# Patient Record
Sex: MALE | Race: WHITE
[De-identification: names, ages, dates, MRNs, and addresses within clinical notes are randomized per-mention and may not be internally consistent; named-entity substitution may affect disease eponyms.]

---

## 2017-02-06 ENCOUNTER — HOSPITAL ENCOUNTER (EMERGENCY)
Dept: HOSPITAL 25 - UCCORT | Age: 67
Discharge: HOME | End: 2017-02-06
Payer: COMMERCIAL

## 2017-02-06 VITALS — SYSTOLIC BLOOD PRESSURE: 141 MMHG | DIASTOLIC BLOOD PRESSURE: 80 MMHG

## 2017-02-06 VITALS — DIASTOLIC BLOOD PRESSURE: 94 MMHG | SYSTOLIC BLOOD PRESSURE: 146 MMHG

## 2017-02-06 DIAGNOSIS — L02.11: Primary | ICD-10-CM

## 2017-02-06 DIAGNOSIS — Z48.01: Primary | ICD-10-CM

## 2017-02-06 PROCEDURE — 99212 OFFICE O/P EST SF 10 MIN: CPT

## 2017-02-06 PROCEDURE — G0463 HOSPITAL OUTPT CLINIC VISIT: HCPCS

## 2017-02-06 PROCEDURE — 99211 OFF/OP EST MAY X REQ PHY/QHP: CPT

## 2017-02-06 NOTE — UC
Skin Complaint HPI





- HPI Summary


HPI Summary: 





large lump on back of neck for a decade. Now irritated, red, painful. No 

drainage. MIld malaise and low-grade fever today





- History of Current Complaint


Chief Complaint: UCSkin


Time Seen by Provider: 02/06/17 07:21


Stated Complaint: LUMP BACK OF NECK


Hx Obtained From: Patient


Onset/Duration: Gradual Onset, Lasting Days - 3


Timing: Constant


Onset Severity: Mild


Current Severity: Moderate


Location: Other - back of neck


Character: Swelling, Redness, Painful


Aggravating: Touch


Alleviating: Nothing


Associated Signs & Symptoms: Positive: Tenderness





- Allergy/Home Medications


Allergies/Adverse Reactions: 


 Allergies











Allergy/AdvReac Type Severity Reaction Status Date / Time


 


No Known Allergies Allergy   Verified 02/06/17 07:38











Home Medications: 


 Home Medications





Aspirin [Fang Aspirin] 650 mg PO ONCE PRN 02/06/17 [History Confirmed 02/06/17]











Review of Systems


Constitutional: Fever


Skin: Other - large tender lump on back of neck


Eyes: Negative


ENT: Negative


Respiratory: Negative


Cardiovascular: Negative


Gastrointestinal: Negative


Genitourinary: Negative


Motor: Negative


Neurovascular: Negative


Musculoskeletal: Negative


Neurological: Negative


Psychological: Negative


All Other Systems Reviewed And Are Negative: Yes





PMH/Surg Hx/FS Hx/Imm Hx


Previously Healthy: Yes





- Surgical History


Surgical History: Yes


Surgery Procedure, Year, and Place: hernia





- Social History


Occupation: Employed Full-time


Lives: With Family


Alcohol Use: None


Substance Use Type: None


Smoking Status (MU): Never Smoked Tobacco





Physical Exam


Triage Information Reviewed: Yes


Appearance: Well-Appearing, No Pain Distress, Well-Nourished


Vital Signs: 


 Initial Vital Signs











Temp  99.6 F   02/06/17 07:31


 


Pulse  134   02/06/17 07:31


 


Resp  18   02/06/17 07:31


 


BP  146/94   02/06/17 07:31


 


Pulse Ox  96   02/06/17 07:31











Vital Signs Reviewed: Yes


Eye Exam: Normal


Dental Exam: Normal


Neck exam: Other - large lump on back of neck, size of half a baseball. REddened

, warm, tender. No drainage. Central umbilication. Consistent with infected 

large epidermal inclusion cyst


Respiratory Exam: Normal


Cardiovascular Exam: Normal


Musculoskeletal: Positive: Strength Intact


Neurological Exam: Normal


Psychological Exam: Normal


Skin Exam: Other - as above





Course/Dx





- Course


Course Of Treatment: area prepped with Betadine. 3cc of plain 1% lidocaine 

infiltrated. STerile #11 blade used to incise abscess. Copious thick, necrotic, 

foul-smelling cheesy material expressed, nearly a cup. Wound left open.





- Diagnoses


Provider Diagnoses: abscess





Discharge





- Discharge Plan


Condition: Stable


Disposition: HOME


Prescriptions: 


Hydrocodone-Acetaminophen [Hydrocodone/Acetaminophen 5-325 mg] 1 - 2 tab PO 

Q6HR PRN #14 tab MDD 6 tab


 PRN Reason: Pain


Sulfamethox/Trimethoprim DS* [Bactrim /160 TAB*] 1 tab PO BID #20 tab


Patient Education Materials:  Abscess (ED)


Referrals: 


Ceci Lynch MD [Medical Doctor] - 


No Primary Care Phys,NOPCP [Primary Care Provider] - 


Additional Instructions: 


Once the wound has completely healed, talk to Dr. Lynch or your other General 

Surgeon about removing the cyst wall. This will re-occur unless the cyst wall 

is removed.

## 2017-02-06 NOTE — UC
Skin Complaint HPI





- HPI Summary


HPI Summary: 





Here this AM, had I&D of neck abscess. Copious thick, malodorous material 

expressed. No bleeding at that time. He went home, and about an hour ago the 

wound bled through the bandage. This concerned his wife. He can't see the area 

because of its location.





- History of Current Complaint


Chief Complaint: UC


Time Seen by Provider: 02/06/17 13:30


Stated Complaint: RE CK NECK


Hx Obtained From: Patient


Onset/Duration: Gradual Onset, Lasting Hours - 4


Timing: Constant


Onset Severity: Mild


Current Severity: None - bleeding stopped on arrival here


Location: Other - back of neck


Character: Pain


Aggravating: Touch


Alleviating: Nothing


Associated Signs & Symptoms: Positive: Tenderness


Related History: Trauma - as above, I&D this AM





- Allergy/Home Medications


Allergies/Adverse Reactions: 


 Allergies











Allergy/AdvReac Type Severity Reaction Status Date / Time


 


No Known Allergies Allergy   Verified 02/06/17 13:31














Review of Systems


Constitutional: Negative


Skin: Other - abscess posterior neck, opened and draining


Eyes: Negative


ENT: Negative


Respiratory: Negative


Cardiovascular: Negative


Gastrointestinal: Negative


Genitourinary: Negative


Motor: Negative


Neurovascular: Negative


Musculoskeletal: Negative


Neurological: Negative


Psychological: Negative


All Other Systems Reviewed And Are Negative: Yes





PMH/Surg Hx/FS Hx/Imm Hx


Previously Healthy: Yes





- Surgical History


Surgical History: Yes


Surgery Procedure, Year, and Place: hernia





- Family History


Known Family History: 


   Negative: Respiratory Disease, Seizure Disorder





- Social History


Occupation: Employed Full-time


Lives: With Family


Alcohol Use: None


Substance Use Type: None


Smoking Status (MU): Never Smoked Tobacco





Physical Exam


Triage Information Reviewed: Yes


Appearance: Well-Appearing, No Pain Distress, Well-Nourished


Vital Signs: 


 Initial Vital Signs











Temp  101.5 F   02/06/17 13:21


 


Pulse  140   02/06/17 13:21


 


Resp  20   02/06/17 13:21


 


BP  141/80   02/06/17 13:21


 


Pulse Ox  95   02/06/17 13:21











Eye Exam: Normal


Neck exam: Other - abscess is draining on back of neck. V-shaped incision about 

2cm total length. No bleeding at present.


Respiratory Exam: Normal


Cardiovascular Exam: Normal


Musculoskeletal Exam: Normal


Neurological Exam: Normal


Psychological Exam: Normal


Skin Exam: Other - as above





Re-Evaluation





- Re-Evaluation


  ** First Eval


Re-Evaluation Time: 14:17


Change: Improved - dressing in place, no bleeding. He requests a work note.





Course/Dx





- Course


Course Of Treatment: bandaged wound. Tylenol for fever of 101.5





- Differential Diagnoses - Skin Complaint


Differential Diagnoses: Abscess





- Diagnoses


Provider Diagnoses: abscess





Discharge





- Discharge Plan


Condition: Stable


Disposition: HOME


Patient Education Materials:  Abscess (ED)


Forms:  *Work Release


Referrals: 


No Primary Care Phys,NOPCP [Primary Care Provider] -

## 2017-02-08 ENCOUNTER — HOSPITAL ENCOUNTER (EMERGENCY)
Dept: HOSPITAL 25 - UCCORT | Age: 67
Discharge: HOME | End: 2017-02-08
Payer: COMMERCIAL

## 2017-02-08 VITALS — DIASTOLIC BLOOD PRESSURE: 76 MMHG | SYSTOLIC BLOOD PRESSURE: 136 MMHG

## 2017-02-08 DIAGNOSIS — R00.0: ICD-10-CM

## 2017-02-08 DIAGNOSIS — L02.11: Primary | ICD-10-CM

## 2017-02-08 PROCEDURE — 93005 ELECTROCARDIOGRAM TRACING: CPT

## 2017-02-08 PROCEDURE — 87641 MR-STAPH DNA AMP PROBE: CPT

## 2017-02-08 PROCEDURE — G0463 HOSPITAL OUTPT CLINIC VISIT: HCPCS

## 2017-02-08 PROCEDURE — 87640 STAPH A DNA AMP PROBE: CPT

## 2017-02-08 PROCEDURE — 87205 SMEAR GRAM STAIN: CPT

## 2017-02-08 PROCEDURE — 99212 OFFICE O/P EST SF 10 MIN: CPT

## 2017-02-08 PROCEDURE — 87086 URINE CULTURE/COLONY COUNT: CPT

## 2017-02-08 PROCEDURE — 87077 CULTURE AEROBIC IDENTIFY: CPT

## 2017-02-08 PROCEDURE — 87070 CULTURE OTHR SPECIMN AEROBIC: CPT

## 2017-02-08 NOTE — UC
Skin Complaint HPI





- HPI Summary


HPI Summary: 





recheck abscess. Feels better. Pain is diminished. Continues to drain purulent 

material, and there is a terrible odor. Thinks there is more stuff to drain 

out. No fever or vomiting at home. Generally feels better.





- History of Current Complaint


Chief Complaint: UCSkin


Time Seen by Provider: 02/08/17 10:49


Stated Complaint: RE CHECK SKIN COMPLAINT


Hx Obtained From: Patient


Onset/Duration: Gradual Onset, Lasting Weeks


Timing: Constant


Onset Severity: Severe


Current Severity: Mild


Location: Other - back of neck


Character: Swelling, Pain, Redness, Raised


Aggravating: Touch


Alleviating: Nothing


Associated Signs & Symptoms: Positive: Tenderness


Related History: Trauma - I&D two days ago, not packed





- Allergy/Home Medications


Allergies/Adverse Reactions: 


 Allergies











Allergy/AdvReac Type Severity Reaction Status Date / Time


 


No Known Allergies Allergy   Verified 02/08/17 10:30














Review of Systems


Constitutional: Negative


Skin: Other - purulent drainage, scant on bandage. Some bleeding, mild


Eyes: Negative


ENT: Negative


Respiratory: Negative


Cardiovascular: Negative


Gastrointestinal: Negative


Genitourinary: Negative


Motor: Negative


Neurovascular: Negative


Musculoskeletal: Negative


Neurological: Negative


Psychological: Negative


All Other Systems Reviewed And Are Negative: Yes





PMH/Surg Hx/FS Hx/Imm Hx


Previously Healthy: Yes





- Surgical History


Surgical History: Yes


Surgery Procedure, Year, and Place: hernia





- Family History


Known Family History: 


   Negative: Respiratory Disease, Seizure Disorder





- Social History


Occupation: Employed Full-time


Lives: With Family


Alcohol Use: None


Substance Use Type: None


Smoking Status (MU): Never Smoked Tobacco





Physical Exam


Triage Information Reviewed: Yes


Appearance: Well-Appearing, No Pain Distress, Well-Nourished


Vital Signs: 


 Initial Vital Signs











Temp  99.1 F   02/08/17 10:25


 


Pulse  132   02/08/17 10:25


 


Resp  18   02/08/17 10:25


 


BP  136/76   02/08/17 10:25


 


Pulse Ox  96   02/08/17 10:25











Vital Signs Reviewed: Yes


Eye Exam: Normal


Neck exam: Other - abscess appears much improved. Less red. LEss swollen. About 

5 tablespoons of thickened purulent material expressed. Mild discomfort. No 

significant bleeding. Really expressed all the material out this time, he could 

tolerate it without anesthesia. Packed the wound with about a foot of iodoform 

gauze, rebandaged wound.





Course/Dx





- Differential Diagnoses - Skin Complaint


Differential Diagnoses: Abscess





- Diagnoses


Provider Diagnoses: recheck abscess





Discharge





- Discharge Plan


Condition: Stable


Disposition: HOME


Patient Education Materials:  Abscess (ED)


Referrals: 


No Primary Care Phys,NOPCP [Primary Care Provider] - 


Additional Instructions: 


The abscess appears to be improving. We really squeezed all of the dead stuff 

out today, and placed an anti-bacterial wick inside the wound. Part of the wick 

is sticking out. This will allow the abscess to continue to drain and will kill 

the germs directly inside the cavity. Return on Friday after 2:30pm for a 

recheck and to have the wick removed. We may or may not repack the wound at 

that time.


If you start to have fever over 101, can't hold the antibiotic down due to 

vomiting, or have worsening pain in the wound, go to the ER. This was a large 

infection, and if it's not improving you may need blood tests and IV antibiotics

## 2017-02-10 ENCOUNTER — HOSPITAL ENCOUNTER (EMERGENCY)
Dept: HOSPITAL 25 - UCCORT | Age: 67
Discharge: HOME | End: 2017-02-10
Payer: COMMERCIAL

## 2017-02-10 VITALS — DIASTOLIC BLOOD PRESSURE: 84 MMHG | SYSTOLIC BLOOD PRESSURE: 131 MMHG

## 2017-02-10 DIAGNOSIS — L02.11: Primary | ICD-10-CM

## 2017-02-10 PROCEDURE — G0463 HOSPITAL OUTPT CLINIC VISIT: HCPCS

## 2017-02-10 PROCEDURE — 99212 OFFICE O/P EST SF 10 MIN: CPT

## 2017-02-10 NOTE — UC
Skin Complaint HPI





- HPI Summary


HPI Summary: 





follow-up abscess. Still oozing. Packing intact. NO vomiting, no fever, feels 

better.





- History of Current Complaint


Chief Complaint: UCWounds


Time Seen by Provider: 02/10/17 15:39


Stated Complaint: RE-CHECK NECK


Hx Obtained From: Patient


Onset/Duration: Gradual Onset, Lasting Weeks - 1


Timing: Constant


Onset Severity: Severe


Current Severity: Mild


Character: Swelling, Redness, Painful


Aggravating: Touch


Alleviating: Nothing


Associated Signs & Symptoms: Positive: Drainage, Tenderness.  Negative: Nausea, 

Vomiting, Fever, Chills, Cough, Chest Pain, Syncope


Related History: Trauma - I&D 4d ago, packed 2d ago





- Allergy/Home Medications


Allergies/Adverse Reactions: 


 Allergies











Allergy/AdvReac Type Severity Reaction Status Date / Time


 


No Known Allergies Allergy   Verified 02/08/17 10:30














Review of Systems


Constitutional: Fatigue - "i just feel worn out"


Skin: Other - oozing from incision


Eyes: Negative


ENT: Negative


Respiratory: Negative


Cardiovascular: Negative


Gastrointestinal: Negative


Genitourinary: Negative


Motor: Negative


Neurovascular: Negative


Musculoskeletal: Negative


Neurological: Negative


Psychological: Negative


All Other Systems Reviewed And Are Negative: Yes





PMH/Surg Hx/FS Hx/Imm Hx


Previously Healthy: Yes





- Surgical History


Surgical History: Yes


Surgery Procedure, Year, and Place: hernia





- Family History


Known Family History: 


   Negative: Respiratory Disease, Seizure Disorder





- Social History


Occupation: Employed Full-time


Lives: With Family


Alcohol Use: None


Substance Use Type: None


Smoking Status (MU): Never Smoked Tobacco





Physical Exam


Triage Information Reviewed: Yes


Appearance: Well-Appearing, No Pain Distress, Well-Nourished


Vital Signs: 


 Initial Vital Signs











Temp  99.0 F   02/10/17 15:32


 


Pulse  121   02/10/17 15:32


 


Resp  16   02/10/17 15:32


 


BP  131/84   02/10/17 15:32


 


Pulse Ox  99   02/10/17 15:32











Vital Signs Reviewed: Yes


Eye Exam: Normal


Neck exam: Normal


Neck: Positive: Supple


Respiratory Exam: Normal


Cardiovascular: Positive: Tachycardia - He has been tachycardic at every visit. 

Was 140 on first day, then 103 on discharge


Musculoskeletal Exam: Normal


Neurological Exam: Normal


Psychological Exam: Normal


Skin Exam: Other - draining yellow pus from incision on back of neck. No odor 

now. Packing removed, pus expressed, repacked. Couldn't get in more than 8" of 

packing material. Redressed wound





Course/Dx





- Course


Course Of Treatment: plan: remove packing April night or MOnday morning. He 

will try to get appt with his surgeon (Dr. Manuel) on Monday morning, or will 

return here April night to get packing removed. I would not plan to pack it 

again. Looking much better





- Differential Diagnoses - Skin Complaint


Differential Diagnoses: Abscess





- Diagnoses


Provider Diagnoses: abscess follow-up





Discharge





- Discharge Plan


Condition: Stable


Disposition: HOME


Patient Education Materials:  Abscess Follow-up (ED)


Forms:  *Work Release


Referrals: 


Reinier Manuel MD [Medical Doctor] - 


No Primary Care Phys,NOPCP [Primary Care Provider] - 


Additional Instructions: 


The packing should be removed April night or Monday morning. SEe if you can 

make an appointment with your General Surgeon for Monday. If not, we will 

remove the packing here. OR, your wife can pull it out if you prefer to do 

that. It will ooze pus for several more days, that would be expected and 

beneficial. Keep it covered until it stops draining. It is looking MUCH 

improved. Finish off the antibiotics. Eventually, once it is completely healed, 

a surgeon can remove the cyst wall so it won't re-occur.

## 2018-07-02 ENCOUNTER — HOSPITAL ENCOUNTER (EMERGENCY)
Dept: HOSPITAL 25 - UCCORT | Age: 68
Discharge: HOME | End: 2018-07-02
Payer: COMMERCIAL

## 2018-07-02 VITALS — DIASTOLIC BLOOD PRESSURE: 80 MMHG | SYSTOLIC BLOOD PRESSURE: 157 MMHG

## 2018-07-02 DIAGNOSIS — Z91.030: ICD-10-CM

## 2018-07-02 DIAGNOSIS — L03.113: Primary | ICD-10-CM

## 2018-07-02 DIAGNOSIS — T63.441A: ICD-10-CM
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
## 2018-07-02 PROCEDURE — G0463 HOSPITAL OUTPT CLINIC VISIT: HCPCS

## 2018-07-02 PROCEDURE — 99212 OFFICE O/P EST SF 10 MIN: CPT

## 2018-07-02 NOTE — UC
Skin Complaint HPI





- HPI Summary


HPI Summary: 





66 yo male presents with bee sting to right forearm sustained 2 days ago. He 

has been taking benadryl, ibuprofen, and icing the area. Yesterday and today 

noticed increased redness and clear drainage from the site. Says that he is not 

"allergic" to bees, but when he gets stung he has bad reactions like this. 

Denies fever, chills, or decreased ROM.





- History of Current Complaint


Time Seen by Provider: 07/02/18 13:41


Stated Complaint: SKIN (INSECT BITE)


Hx Obtained From: Patient


Onset/Duration: Gradual Onset


Skin Exposure Onset/Duration: Days Ago


Timing: Constant


Onset Severity: Mild


Current Severity: Mild


Pain Intensity: 4


Pain Scale Used: 0-10 Numeric





- Allergy/Home Medications


Allergies/Adverse Reactions: 


 Allergies











Allergy/AdvReac Type Severity Reaction Status Date / Time


 


bee venom protein (honey bee) Allergy  Swelling Verified 07/02/18 13:40











Home Medications: 


 Home Medications





Ibuprofen TAB* [Advil TAB*] 600 mg PO Q6H PRN 07/02/18 [History Confirmed 07/02/ 18]


diPHENhydraMINE PO* [Benadryl PO 25 MG TAB*] 25 mg PO Q6H PRN 07/02/18 [History 

Confirmed 07/02/18]











Review of Systems


Constitutional: Negative


Skin: Rash


Respiratory: Negative


Cardiovascular: Negative


Neurovascular: Negative


Musculoskeletal: Negative


Neurological: Negative


Psychological: Negative


All Other Systems Reviewed And Are Negative: Yes





PMH/Surg Hx/FS Hx/Imm Hx





- Additional Past Medical History


Additional PMH: 





none





- Surgical History


Surgical History: Yes


Surgery Procedure, Year, and Place: hernia





- Family History


Known Family History: Positive: None


   Negative: Respiratory Disease, Seizure Disorder





- Social History


Occupation: Retired


Lives: With Family


Alcohol Use: None


Substance Use Type: None


Smoking Status (MU): Never Smoked Tobacco





Physical Exam





- Summary


Physical Exam Summary: 





GENERAL: NAD. WDWN. No pain distress.


SKIN: Right dorsal forearm: Distal 1/3 with bee sting and 2.5cm surrounding 

mild erythema and warmth in all directions. Central area with scant clear 

drainage. Mild TTP. No streaking, bleeding, or drainage.


NECK: Supple. Nontender. No lymphadenopathy. 


CHEST:  No accessory muscle use. Breathing comfortably and in no distress.


CV:  Pulses intact radial and ulnar.


MSK: Right hand and all fingers FROM


NEURO: Alert. CN II-XII grossly intact.


PSYCH: Age appropriate behavior.





Triage Information Reviewed: Yes


Vital Signs: 





Vital Signs:











Temp Pulse Resp BP Pulse Ox


 


 99.4 F   112   17   157/80   97 


 


 07/02/18 13:38  07/02/18 13:38  07/02/18 13:38  07/02/18 13:38  07/02/18 13:38














Course/Dx





- Course


Course Of Treatment: Nontoxic appearing. Afebrile. Cellulitis s/p bee sting 

right arm - keflex. Continue with benadryl and icing the area





- Diagnoses


Provider Diagnoses: Cellulitis due to bee sting right arm





Discharge





- Sign-Out/Discharge


Documenting (check all that apply): Discharge/Admit/Transfer





- Discharge Plan


Condition: Stable


Disposition: HOME


Prescriptions: 


Cephalexin CAP* [Keflex CAP*] 500 mg PO BID #14 cap


Patient Education Materials:  Insect Bite or Sting (ED)


Referrals: 


No Primary Care Phys,NOPCP [Primary Care Provider] - 


Additional Instructions: 


If you develop a fever, shortness of breath, chest pain, new or worsening 

symptoms - please call your PCP or go to the ED.


 





Your blood pressure was high at todays visit. Please see your primary provider 

within 4 weeks for recheck and re-evaluation.








- Billing Disposition and Condition


Condition: STABLE


Disposition: Home

## 2018-07-17 ENCOUNTER — HOSPITAL ENCOUNTER (EMERGENCY)
Dept: HOSPITAL 25 - UCCORT | Age: 68
Discharge: HOME | End: 2018-07-17
Payer: COMMERCIAL

## 2018-07-17 VITALS — SYSTOLIC BLOOD PRESSURE: 143 MMHG | DIASTOLIC BLOOD PRESSURE: 89 MMHG
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
## 2018-07-17 DIAGNOSIS — S70.311A: Primary | ICD-10-CM
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
## 2018-07-17 PROCEDURE — 90471 IMMUNIZATION ADMIN: CPT

## 2018-07-17 PROCEDURE — 90715 TDAP VACCINE 7 YRS/> IM: CPT

## 2018-07-17 PROCEDURE — 99211 OFF/OP EST MAY X REQ PHY/QHP: CPT

## 2018-07-17 PROCEDURE — G0463 HOSPITAL OUTPT CLINIC VISIT: HCPCS

## 2018-07-17 NOTE — UC
Bite Injury/Animal HPI





- HPI Summary


HPI Summary: 





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  He notes that it did not tear any holes in his jeans.  

He states that he notified his employer and he's been told that the dog is up-to

-date on its rabies vaccinations.  He denies any pain or swelling or drainage.  

He reports that he is here at the encouragement of his wife.  She wants him to 

get the site checked. His tetanus is not utd.





- History of Current Complaint


Stated Complaint: DOG BITE


Time Seen by Provider: 07/17/18 21:37


Hx Obtained From: Patient


Onset/Duration: Sudden Onset


Has Animal Been Immunized?: Yes


Aggravating Factor(s): Nothing


Alleviating Factor(s): Nothing


Associated Signs And Symptoms: Negative: Fever, Erythema, Swelling, 

Lymphadenopathy, Numbness/Tingling





- Allergies/Home Medications


Allergies/Adverse Reactions: 


 Allergies











Allergy/AdvReac Type Severity Reaction Status Date / Time


 


bee venom protein (honey bee) Allergy  Swelling Verified 07/02/18 13:40











Home Medications: 


 Home Medications





NK [No Home Medications Reported]  07/17/18 [History Confirmed 07/17/18]











PMH/Surg Hx/FS Hx/Imm Hx


Previously Healthy: Yes





- Surgical History


Surgical History: Yes


Surgery Procedure, Year, and Place: hernia





- Family History


Known Family History: Positive: None


   Negative: Respiratory Disease, Seizure Disorder





- Social History


Occupation: Employed Full-time


Lives: With Family


Alcohol Use: None


Substance Use Type: None


Smoking Status (MU): Never Smoked Tobacco





- Immunization History


Hx Tetanus, Diphtheria Vaccination: No


Vaccination Up to Date: Yes





Review of Systems


Constitutional: Negative


Skin: Other - bite R thigh


Eyes: Negative


ENT: Negative


Respiratory: Negative


Cardiovascular: Negative


Gastrointestinal: Negative


Genitourinary: Negative


Motor: Negative


Neurovascular: Negative


Musculoskeletal: Negative


Neurological: Negative


Psychological: Negative


Is Patient Immunocompromised?: No


All Other Systems Reviewed And Are Negative: Yes





Physical Exam


Triage Information Reviewed: Yes


Appearance: Well-Appearing


Vital Signs Reviewed: Yes


Eyes: Positive: Conjunctiva Clear


ENT: Positive: Normal ENT inspection


Neck: Positive: Supple, Nontender, No Lymphadenopathy


Respiratory: Positive: Lungs clear, Normal breath sounds


Cardiovascular: Positive: RRR, No Murmur


Abdomen Description: Positive: Nontender, No Organomegaly, Soft


Bowel Sounds: Positive: Present


Musculoskeletal: Positive: ROM Intact


Neurological: Positive: Alert


Psychological: Positive: Age Appropriate Behavior


Skin Exam: Normal, Other - Right thigh: Superficial abrasions in the pattern of 

a bite were noted to the patient's right lateral distal thigh.  There is no 

erythema, warmth, tenderness, swelling or drainage.  There is no inguinal 

adenopathy.  There is to the right lower extremity is unremarkable.





Bite Injury Course/Dx





- Course


Course Of Treatment: bite is superficial and did not tear pt's jeans. there are 

no s/s's of infection. benefit of po antibiotic does not outweigh risk of 

potential side effects in this case.





- Differential Dx/Diagnosis


Provider Diagnoses: Abrasions R thigh from dog bite.





Discharge





- Sign-Out/Discharge


Documenting (check all that apply): Patient Departure





- Discharge Plan


Condition: Stable


Disposition: HOME


Patient Education Materials:  Animal Bite (ED), Hypertension (ED)


Referrals: 


BOBBI German [Medical Doctor] - As Soon As Possible


Additional Instructions: 


DIAGNOSIS: ABRASION R THIGH FROM DOG BITE. ELEVATED BLOOD PRESSURE 143/89





- Billing Disposition and Condition


Condition: STABLE


Disposition: Home

## 2018-07-23 ENCOUNTER — HOSPITAL ENCOUNTER (EMERGENCY)
Dept: HOSPITAL 25 - UCCORT | Age: 68
Discharge: ANOTHER HOSPITAL | End: 2018-07-23
Payer: COMMERCIAL

## 2018-07-23 VITALS — DIASTOLIC BLOOD PRESSURE: 97 MMHG | SYSTOLIC BLOOD PRESSURE: 123 MMHG

## 2018-07-23 DIAGNOSIS — R00.0: ICD-10-CM

## 2018-07-23 DIAGNOSIS — R73.9: Primary | ICD-10-CM

## 2018-07-23 DIAGNOSIS — R53.83: ICD-10-CM

## 2018-07-23 PROCEDURE — 99212 OFFICE O/P EST SF 10 MIN: CPT

## 2018-07-23 PROCEDURE — G0463 HOSPITAL OUTPT CLINIC VISIT: HCPCS

## 2018-07-23 PROCEDURE — 93005 ELECTROCARDIOGRAM TRACING: CPT

## 2018-07-23 PROCEDURE — 81003 URINALYSIS AUTO W/O SCOPE: CPT

## 2018-07-23 NOTE — UC
Complaint Male HPI





- HPI Summary


HPI Summary: 





The patient is a 67-year-old male with a 2+ week history of polyuria and 

polydipsia.  He denies any chest pain or shortness of breath.  He has been 

feeling markedly fatigued.  Does not have a primary care doctor.





- History of Current Complaint


Chief Complaint: UCGeneralIllness


Stated Complaint: FATIGUE/WATERY EYES/EAR COMPLAINT


Time Seen by Provider: 07/23/18 10:32


Hx Obtained From: Patient


Onset/Duration: Gradual Onset, Lasting Weeks


Timing: Constant


Severity Initially: Mild


Severity Currently: Moderate


Pain Intensity: 0


Aggravating Factor(s): Other - none


Alleviating Factor(s): Other - none





- Allergies/Home Medications


Allergies/Adverse Reactions: 


 Allergies











Allergy/AdvReac Type Severity Reaction Status Date / Time


 


bee venom protein (honey bee) Allergy  Swelling Verified 07/23/18 10:20














PMH/Surg Hx/FS Hx/Imm Hx


Previously Healthy: Yes





- Surgical History


Surgical History: Yes


Surgery Procedure, Year, and Place: Left Inguinal Herniorrhaphy, ~2000, [place]





- Family History


Known Family History: Positive: None, Hypertension


   Negative: Respiratory Disease, Seizure Disorder





- Social History


Alcohol Use: None


Substance Use Type: None


Smoking Status (MU): Never Smoked Tobacco





- Immunization History


Most Recent Tetanus Shot: 7/17/18


Hx Tetanus, Diphtheria Vaccination: No


Vaccination Up to Date: Yes





Review of Systems


Constitutional: Fatigue


Skin: Negative


Eyes: Negative


ENT: Negative


Respiratory: Negative


Cardiovascular: Negative


Gastrointestinal: Negative


Genitourinary: Frequency


Motor: Negative


Neurovascular: Negative


Musculoskeletal: Negative


Neurological: Negative


Psychological: Negative


Is Patient Immunocompromised?: No


All Other Systems Reviewed And Are Negative: Yes





Physical Exam


Triage Information Reviewed: Yes


Appearance: Well-Appearing, No Pain Distress, Well-Nourished


Vital Signs: 


 Initial Vital Signs











Temp  98.1 F   07/23/18 10:15


 


Pulse  112   07/23/18 10:15


 


Resp  18   07/23/18 10:15


 


BP  123/97   07/23/18 10:15


 


Pulse Ox  100   07/23/18 10:15











Eyes: Positive: Conjunctiva Clear


ENT: Positive: Hearing grossly normal.  Negative: Nasal congestion, Nasal 

drainage, Trismus, Muffled voice, Hoarse voice


Neck: Positive: Supple, Nontender


Respiratory: Positive: Lungs clear, Normal breath sounds, No respiratory 

distress


Cardiovascular: Positive: RRR, Tachycardia


Musculoskeletal: Positive: ROM Intact, No Edema


Neurological: Positive: Alert


Skin Exam: Normal





Diagnostics





- EKG


Cardiac Rate: Tachycardia


Cardiac Rhythm: Sinus: Normal


Ectopy: None


ST Segment: Normal





 Complaint Male Course/Dx





- Course


Course Of Treatment: UA ++ glucse.  FS- too high to read.  d/w Milka Kowalski NP

, accepts pt, no [facility] via POV





- Differential Dx/Diagnosis


Provider Diagnoses: hyperglycemia.  suspect non onset DM.  tachycardia.  fatigue





Discharge





- Sign-Out/Discharge


Documenting (check all that apply): Patient Departure





- Discharge Plan


Condition: Stable


Disposition: TRANS HIGHER LVL OF CARE FAC


Referrals: 


No Primary Care Phys,NOPCP [Primary Care Provider] - 


Additional Instructions: 


please go directly to the ER at [facility]





you blood sugar is high and needs evaluating





- Billing Disposition and Condition


Condition: STABLE


Disposition: Trans Higher Lvl of Care Fac